# Patient Record
Sex: FEMALE | Race: WHITE | Employment: FULL TIME | URBAN - METROPOLITAN AREA
[De-identification: names, ages, dates, MRNs, and addresses within clinical notes are randomized per-mention and may not be internally consistent; named-entity substitution may affect disease eponyms.]

---
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
## 2021-12-29 ENCOUNTER — TELEPHONE (OUTPATIENT)
Dept: PRIMARY CARE CLINIC | Age: 21
End: 2021-12-29

## 2021-12-29 NOTE — LETTER
[facility]  [address]  Phone: [phone]  Fax: [phone], APRN - CNP        December 29, 2021     Gallo Gresham  [address]  [address] [facility] [address]      Dear Samuel Lea: We are sorry that you missed your appointment with Benton Negrete on 12/29/2021. Your health and follow-up medical care are important to us. Please call our office as soon as possible so that we may reschedule your appointment. If you have already rescheduled your appointment, please disregard this letter.     Sincerely,        Benton Negrete, RADHA Bolaños CNP

## 2022-04-04 ENCOUNTER — HOSPITAL ENCOUNTER (EMERGENCY)
Age: 22
Discharge: HOME | End: 2022-04-04
Attending: EMERGENCY MEDICINE
Payer: MEDICARE

## 2022-04-04 VITALS
HEIGHT: 66 IN | SYSTOLIC BLOOD PRESSURE: 120 MMHG | RESPIRATION RATE: 18 BRPM | WEIGHT: 170 LBS | OXYGEN SATURATION: 100 % | BODY MASS INDEX: 27.32 KG/M2 | HEART RATE: 96 BPM | DIASTOLIC BLOOD PRESSURE: 47 MMHG | TEMPERATURE: 98.3 F

## 2022-04-04 DIAGNOSIS — R10.2 PELVIC PAIN: Primary | ICD-10-CM

## 2022-04-04 LAB
BACTERIA: ABNORMAL
BILIRUBIN URINE: NEGATIVE
CANDIDA SPECIES, DNA PROBE: NEGATIVE
CASTS UA: ABNORMAL /LPF
COLOR: YELLOW
EPITHELIAL CELLS UA: ABNORMAL /HPF
GARDNERELLA VAGINALIS, DNA PROBE: NEGATIVE
GLUCOSE URINE: NEGATIVE
HCG(URINE) PREGNANCY TEST: NEGATIVE
KETONES, URINE: ABNORMAL
LEUKOCYTE ESTERASE, URINE: ABNORMAL
NITRITE, URINE: NEGATIVE
PH UA: 5.5 (ref 5–8)
PROTEIN UA: NEGATIVE
RBC UA: ABNORMAL /HPF
SOURCE: NORMAL
SPECIFIC GRAVITY UA: 1.04 (ref 1–1.03)
TRICHOMONAS VAGINALIS DNA: NEGATIVE
TURBIDITY: CLEAR
URINE HGB: NEGATIVE
UROBILINOGEN, URINE: NORMAL
WBC UA: ABNORMAL /HPF

## 2022-04-04 PROCEDURE — 96372 THER/PROPH/DIAG INJ SC/IM: CPT

## 2022-04-04 PROCEDURE — 87480 CANDIDA DNA DIR PROBE: CPT

## 2022-04-04 PROCEDURE — 81025 URINE PREGNANCY TEST: CPT

## 2022-04-04 PROCEDURE — 87510 GARDNER VAG DNA DIR PROBE: CPT

## 2022-04-04 PROCEDURE — 6360000002 HC RX W HCPCS: Performed by: EMERGENCY MEDICINE

## 2022-04-04 PROCEDURE — 87591 N.GONORRHOEAE DNA AMP PROB: CPT

## 2022-04-04 PROCEDURE — 87660 TRICHOMONAS VAGIN DIR PROBE: CPT

## 2022-04-04 PROCEDURE — 6370000000 HC RX 637 (ALT 250 FOR IP): Performed by: EMERGENCY MEDICINE

## 2022-04-04 PROCEDURE — 99283 EMERGENCY DEPT VISIT LOW MDM: CPT

## 2022-04-04 PROCEDURE — 81001 URINALYSIS AUTO W/SCOPE: CPT

## 2022-04-04 PROCEDURE — 87491 CHLMYD TRACH DNA AMP PROBE: CPT

## 2022-04-04 RX ORDER — DOXYCYCLINE 100 MG/1
100 CAPSULE ORAL ONCE
Status: COMPLETED | OUTPATIENT
Start: 2022-04-04 | End: 2022-04-04

## 2022-04-04 RX ORDER — CEFTRIAXONE 1 G/1
500 INJECTION, POWDER, FOR SOLUTION INTRAMUSCULAR; INTRAVENOUS ONCE
Status: COMPLETED | OUTPATIENT
Start: 2022-04-04 | End: 2022-04-04

## 2022-04-04 RX ORDER — DOXYCYCLINE HYCLATE 100 MG
100 TABLET ORAL 2 TIMES DAILY
Qty: 20 TABLET | Refills: 0 | Status: SHIPPED | OUTPATIENT
Start: 2022-04-04 | End: 2022-04-14

## 2022-04-04 RX ADMIN — CEFTRIAXONE SODIUM 500 MG: 1 INJECTION, POWDER, FOR SOLUTION INTRAMUSCULAR; INTRAVENOUS at 22:25

## 2022-04-04 RX ADMIN — DOXYCYCLINE 100 MG: 100 CAPSULE ORAL at 22:25

## 2022-04-04 ASSESSMENT — ENCOUNTER SYMPTOMS
EYE REDNESS: 0
COLOR CHANGE: 0
COUGH: 0
SINUS PRESSURE: 0
TROUBLE SWALLOWING: 0
FACIAL SWELLING: 0
NAUSEA: 0
ABDOMINAL PAIN: 1
SHORTNESS OF BREATH: 0
CONSTIPATION: 0
DIARRHEA: 0
BLOOD IN STOOL: 0
CHEST TIGHTNESS: 0
BACK PAIN: 0
EYE DISCHARGE: 0
SORE THROAT: 0
VOMITING: 0
EYE PAIN: 0
RHINORRHEA: 0
WHEEZING: 0

## 2022-04-05 LAB
C TRACH DNA GENITAL QL NAA+PROBE: NEGATIVE
N. GONORRHOEAE DNA: NEGATIVE
SPECIMEN DESCRIPTION: NORMAL

## 2022-04-05 NOTE — ED NOTES
Mode of arrival (squad #, walk in, police, etc) : walk in        Chief complaint(s): abdominal cramping, concern for std, new partner, late menstral cycle        Arrival Note (brief scenario, treatment PTA, etc). : pt reports lower suprapubic cramps/pain, new partner, LMP 03/02/22, vaginal discharge. Denies urinary symptoms or nausea/vomiting.          C= \"Have you ever felt that you should Cut down on your drinking? \"  No  A= \"Have people Annoyed you by criticizing your drinking? \"  No  G= \"Have you ever felt bad or Guilty about your drinking? \"  No  E= \"Have you ever had a drink as an Eye-opener first thing in the morning to steady your nerves or to help a hangover? \"  No      Deferred []      Reason for deferring: N/A    *If yes to two or more: probable alcohol abuse. Aravind Pineda RN  04/04/22 1001
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
## 2022-05-17 ENCOUNTER — HOSPITAL ENCOUNTER (OUTPATIENT)
Age: 22
Discharge: HOME | End: 2022-05-17
Payer: MEDICARE

## 2022-05-17 ENCOUNTER — TELEPHONE (OUTPATIENT)
Dept: OBGYN CLINIC | Age: 22
End: 2022-05-17

## 2022-05-17 DIAGNOSIS — N92.6 MISSED MENSES: Primary | ICD-10-CM

## 2022-05-17 DIAGNOSIS — N92.6 MISSED MENSES: ICD-10-CM

## 2022-05-17 LAB — HCG QUANTITATIVE: 1072 MIU/ML

## 2022-05-17 PROCEDURE — 36415 COLL VENOUS BLD VENIPUNCTURE: CPT

## 2022-05-17 PROCEDURE — 84702 CHORIONIC GONADOTROPIN TEST: CPT

## 2022-05-17 RX ORDER — VITAMIN A ACETATE, .BETA.-CAROTENE, ASCORBIC ACID, CHOLECALCIFEROL, .ALPHA.-TOCOPHEROL ACETATE, DL-, THIAMINE MONONITRATE, RIBOFLAVIN, NIACINAMIDE, PYRIDOXINE HYDROCHLORIDE, FOLIC ACID, CYANOCOBALAMIN, CALCIUM CARBONATE, FERROUS FUMARATE, ZINC OXIDE, AND CUPRIC OXIDE 2000; 2000; 120; 400; 22; 1.84; 3; 20; 10; 1; 12; 200; 27; 25; 2 [IU]/1; [IU]/1; MG/1; [IU]/1; MG/1; MG/1; MG/1; MG/1; MG/1; MG/1; UG/1; MG/1; MG/1; MG/1; MG/1
1 TABLET ORAL DAILY
Qty: 30 TABLET | Refills: 12 | Status: SHIPPED | OUTPATIENT
Start: 2022-05-17

## 2022-05-17 NOTE — TELEPHONE ENCOUNTER
Per Liseth Rainey NP, pt notified of + quant; LMP 4/13/22. Pt to repeat quant in 48 hours; order in epic. PNV to be sent to pt pharmacy. Pt verbalized understanding.

## 2022-05-19 ENCOUNTER — HOSPITAL ENCOUNTER (OUTPATIENT)
Age: 22
Discharge: HOME | End: 2022-05-19
Payer: MEDICARE

## 2022-05-19 DIAGNOSIS — N92.6 MISSED MENSES: ICD-10-CM

## 2022-05-19 LAB — HCG QUANTITATIVE: 3250 MIU/ML

## 2022-05-19 PROCEDURE — 84702 CHORIONIC GONADOTROPIN TEST: CPT

## 2022-05-19 PROCEDURE — 36415 COLL VENOUS BLD VENIPUNCTURE: CPT

## 2022-05-20 ENCOUNTER — TELEPHONE (OUTPATIENT)
Dept: OBGYN CLINIC | Age: 22
End: 2022-05-20

## 2022-05-20 NOTE — TELEPHONE ENCOUNTER
Tired calling patient regarding quant results. Pt phone not accepting calls at this time. I will try later. Pt will need OB intake/US scheduled.

## 2022-05-20 NOTE — TELEPHONE ENCOUNTER
----- Message from RADHA Amin CNP sent at 5/20/2022  7:36 AM EDT -----  Please schedule for new OB intake/ ultrasound  Prenatal vitamin 1 PO QD with 12 refills

## 2022-05-25 ENCOUNTER — TELEPHONE (OUTPATIENT)
Dept: OBGYN CLINIC | Age: 22
End: 2022-05-25

## 2022-05-25 NOTE — TELEPHONE ENCOUNTER
----- Message from RADHA Tanner CNP sent at 5/20/2022  7:36 AM EDT -----  Please schedule for new OB intake/ ultrasound  Prenatal vitamin 1 PO QD with 12 refills

## 2022-05-25 NOTE — TELEPHONE ENCOUNTER
Per kehinde pt notified of quant results and pt scheduled for OB intake/US. Per pt she is currently taking PNV. Pt LMP 4/13/22.

## 2022-06-21 ENCOUNTER — INITIAL PRENATAL (OUTPATIENT)
Dept: OBGYN CLINIC | Age: 22
End: 2022-06-21
Payer: MEDICARE

## 2022-06-21 ENCOUNTER — PROCEDURE VISIT (OUTPATIENT)
Dept: OBGYN CLINIC | Age: 22
End: 2022-06-21
Payer: MEDICARE

## 2022-06-21 VITALS — BODY MASS INDEX: 27.12 KG/M2 | WEIGHT: 168 LBS

## 2022-06-21 DIAGNOSIS — Z3A.09 9 WEEKS GESTATION OF PREGNANCY: Primary | ICD-10-CM

## 2022-06-21 DIAGNOSIS — O36.80X0 PREGNANCY WITH INCONCLUSIVE FETAL VIABILITY, SINGLE OR UNSPECIFIED FETUS: Primary | ICD-10-CM

## 2022-06-21 DIAGNOSIS — Z3A.09 9 WEEKS GESTATION OF PREGNANCY: ICD-10-CM

## 2022-06-21 DIAGNOSIS — Z34.90 SECOND PREGNANCY: ICD-10-CM

## 2022-06-21 DIAGNOSIS — Z36.9 1ST TRIMESTER SCREENING: ICD-10-CM

## 2022-06-21 LAB
CRL: NORMAL
SAC DIAMETER: NORMAL

## 2022-06-21 PROCEDURE — 76801 OB US < 14 WKS SINGLE FETUS: CPT | Performed by: OBSTETRICS & GYNECOLOGY

## 2022-06-21 PROCEDURE — H1000 PRENATAL CARE ATRISK ASSESSM: HCPCS | Performed by: NURSE PRACTITIONER

## 2022-06-21 NOTE — PROGRESS NOTES
Patient presents to office for ob intake, nurse collection only. Intake following ultrasound in office, confirming pregnancy at 9 weeks. Patient reports that this was an unplanned pregnancy with her boyfriend. Patient accepting of pregnancy. Currently taking prenatal vitamins. This is patient's second pregnancy. See obstetrical history for details. Patient's medical, surgical, family, and obstetrical history reviewed. See EHR for details. Referral to [facility] placed for first trimester screening/nuchal scan upon patient's request by front office staff. Complete ob consent forms reviewed. First trimester labs ordered/cosigned per CNP. Patient advised to complete prior to follow up prenatal visit, which was scheduled upon check out. Patient encouraged to call the office with questions/concerns.

## 2022-07-11 ENCOUNTER — ROUTINE PRENATAL (OUTPATIENT)
Dept: PERINATAL CARE | Age: 22
End: 2022-07-11
Payer: MEDICARE

## 2022-07-11 VITALS
BODY MASS INDEX: 26.33 KG/M2 | SYSTOLIC BLOOD PRESSURE: 106 MMHG | WEIGHT: 163.8 LBS | DIASTOLIC BLOOD PRESSURE: 62 MMHG | HEIGHT: 66 IN | TEMPERATURE: 97.2 F | HEART RATE: 75 BPM | RESPIRATION RATE: 16 BRPM

## 2022-07-11 DIAGNOSIS — Z36.9 FIRST TRIMESTER SCREENING: Primary | ICD-10-CM

## 2022-07-11 DIAGNOSIS — Z3A.12 12 WEEKS GESTATION OF PREGNANCY: ICD-10-CM

## 2022-07-11 DIAGNOSIS — O36.80X0 ENCOUNTER TO DETERMINE FETAL VIABILITY OF PREGNANCY, SINGLE OR UNSPECIFIED FETUS: ICD-10-CM

## 2022-07-11 LAB
CRL: NORMAL
SAC DIAMETER: NORMAL

## 2022-07-11 PROCEDURE — 76801 OB US < 14 WKS SINGLE FETUS: CPT | Performed by: OBSTETRICS & GYNECOLOGY

## 2022-07-11 PROCEDURE — 99999 PR OFFICE/OUTPT VISIT,PROCEDURE ONLY: CPT | Performed by: OBSTETRICS & GYNECOLOGY

## 2022-07-11 PROCEDURE — 76813 OB US NUCHAL MEAS 1 GEST: CPT | Performed by: OBSTETRICS & GYNECOLOGY

## 2022-07-20 ENCOUNTER — ROUTINE PRENATAL (OUTPATIENT)
Dept: OBGYN CLINIC | Age: 22
End: 2022-07-20
Payer: MEDICARE

## 2022-07-20 VITALS
BODY MASS INDEX: 26.31 KG/M2 | WEIGHT: 163 LBS | DIASTOLIC BLOOD PRESSURE: 72 MMHG | SYSTOLIC BLOOD PRESSURE: 100 MMHG | HEART RATE: 109 BPM

## 2022-07-20 DIAGNOSIS — Z34.90 SECOND PREGNANCY: Primary | ICD-10-CM

## 2022-07-20 DIAGNOSIS — Z3A.14 14 WEEKS GESTATION OF PREGNANCY: ICD-10-CM

## 2022-07-20 DIAGNOSIS — Z87.59 HISTORY OF GESTATIONAL HYPERTENSION: ICD-10-CM

## 2022-07-20 PROBLEM — O09.30 LIMITED PRENATAL CARE: Status: RESOLVED | Noted: 2020-06-14 | Resolved: 2022-07-20

## 2022-07-20 PROBLEM — O09.899 HIGH RISK TEEN PREGNANCY: Status: RESOLVED | Noted: 2020-06-14 | Resolved: 2022-07-20

## 2022-07-20 PROBLEM — O47.9 UTERINE CONTRACTIONS DURING PREGNANCY: Status: RESOLVED | Noted: 2020-06-14 | Resolved: 2022-07-20

## 2022-07-20 PROBLEM — O40.9XX0 POLYHYDRAMNIOS: Status: RESOLVED | Noted: 2020-07-19 | Resolved: 2022-07-20

## 2022-07-20 PROBLEM — O99.810 ABNORMAL GLUCOSE TOLERANCE IN PREGNANCY: Status: RESOLVED | Noted: 2020-06-14 | Resolved: 2022-07-20

## 2022-07-20 PROCEDURE — 99214 OFFICE O/P EST MOD 30 MIN: CPT | Performed by: NURSE PRACTITIONER

## 2022-07-20 PROCEDURE — G8427 DOCREV CUR MEDS BY ELIG CLIN: HCPCS | Performed by: NURSE PRACTITIONER

## 2022-07-20 PROCEDURE — 1036F TOBACCO NON-USER: CPT | Performed by: NURSE PRACTITIONER

## 2022-07-20 PROCEDURE — G8419 CALC BMI OUT NRM PARAM NOF/U: HCPCS | Performed by: NURSE PRACTITIONER
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
## 2022-09-13 ENCOUNTER — TELEPHONE (OUTPATIENT)
Dept: OBGYN CLINIC | Age: 22
End: 2022-09-13

## 2022-09-22 ENCOUNTER — TELEPHONE (OUTPATIENT)
Dept: OBGYN CLINIC | Age: 22
End: 2022-09-22

## 2022-10-10 ENCOUNTER — TELEPHONE (OUTPATIENT)
Dept: OBGYN CLINIC | Age: 22
End: 2022-10-10

## 2022-10-10 NOTE — TELEPHONE ENCOUNTER
Attempted to reach patient to r/s missed appt/ # not in service. [phone] )  Called pts emergency contact, # not in service.  ([phone])  Letter mailed